# Patient Record
Sex: FEMALE | Race: OTHER | Employment: FULL TIME | URBAN - METROPOLITAN AREA
[De-identification: names, ages, dates, MRNs, and addresses within clinical notes are randomized per-mention and may not be internally consistent; named-entity substitution may affect disease eponyms.]

---

## 2023-03-16 ENCOUNTER — HOSPITAL ENCOUNTER (EMERGENCY)
Facility: HOSPITAL | Age: 38
Discharge: HOME | End: 2023-03-16
Attending: EMERGENCY MEDICINE

## 2023-03-16 ENCOUNTER — APPOINTMENT (OUTPATIENT)
Dept: ULTRASOUND IMAGING | Facility: HOSPITAL | Age: 38
End: 2023-03-16
Attending: EMERGENCY MEDICINE

## 2023-03-16 VITALS
WEIGHT: 110 LBS | RESPIRATION RATE: 19 BRPM | DIASTOLIC BLOOD PRESSURE: 74 MMHG | SYSTOLIC BLOOD PRESSURE: 135 MMHG | OXYGEN SATURATION: 97 % | TEMPERATURE: 98 F | HEIGHT: 64 IN | BODY MASS INDEX: 18.78 KG/M2 | HEART RATE: 86 BPM

## 2023-03-16 DIAGNOSIS — O20.0 THREATENED ABORTION: Primary | ICD-10-CM

## 2023-03-16 LAB
ALBUMIN SERPL-MCNC: 3.4 G/DL (ref 3.4–5)
ALP LIVER SERPL-CCNC: 82 U/L
ALT SERPL-CCNC: 14 U/L
ANION GAP SERPL CALC-SCNC: 7 MMOL/L (ref 0–18)
AST SERPL-CCNC: 7 U/L (ref 15–37)
B-HCG SERPL-ACNC: ABNORMAL MIU/ML
BASOPHILS # BLD AUTO: 0.03 X10(3) UL (ref 0–0.2)
BASOPHILS NFR BLD AUTO: 0.3 %
BILIRUB DIRECT SERPL-MCNC: <0.1 MG/DL (ref 0–0.2)
BILIRUB SERPL-MCNC: 0.5 MG/DL (ref 0.1–2)
BILIRUB UR QL: NEGATIVE
BUN BLD-MCNC: 11 MG/DL (ref 7–18)
BUN/CREAT SERPL: 16.2 (ref 10–20)
CALCIUM BLD-MCNC: 9.1 MG/DL (ref 8.5–10.1)
CHLORIDE SERPL-SCNC: 107 MMOL/L (ref 98–112)
CLARITY UR: CLEAR
CO2 SERPL-SCNC: 26 MMOL/L (ref 21–32)
COLOR UR: YELLOW
CREAT BLD-MCNC: 0.68 MG/DL
DEPRECATED RDW RBC AUTO: 38.9 FL (ref 35.1–46.3)
EOSINOPHIL # BLD AUTO: 0.13 X10(3) UL (ref 0–0.7)
EOSINOPHIL NFR BLD AUTO: 1.4 %
ERYTHROCYTE [DISTWIDTH] IN BLOOD BY AUTOMATED COUNT: 12.9 % (ref 11–15)
GFR SERPLBLD BASED ON 1.73 SQ M-ARVRAT: 114 ML/MIN/1.73M2 (ref 60–?)
GLUCOSE BLD-MCNC: 142 MG/DL (ref 70–99)
GLUCOSE UR-MCNC: NEGATIVE MG/DL
HCT VFR BLD AUTO: 32.6 %
HGB BLD-MCNC: 11.1 G/DL
IMM GRANULOCYTES # BLD AUTO: 0.02 X10(3) UL (ref 0–1)
IMM GRANULOCYTES NFR BLD: 0.2 %
KETONES UR-MCNC: NEGATIVE MG/DL
LYMPHOCYTES # BLD AUTO: 3.27 X10(3) UL (ref 1–4)
LYMPHOCYTES NFR BLD AUTO: 34 %
MCH RBC QN AUTO: 28.8 PG (ref 26–34)
MCHC RBC AUTO-ENTMCNC: 34 G/DL (ref 31–37)
MCV RBC AUTO: 84.5 FL
MONOCYTES # BLD AUTO: 0.53 X10(3) UL (ref 0.1–1)
MONOCYTES NFR BLD AUTO: 5.5 %
NEUTROPHILS # BLD AUTO: 5.63 X10 (3) UL (ref 1.5–7.7)
NEUTROPHILS # BLD AUTO: 5.63 X10(3) UL (ref 1.5–7.7)
NEUTROPHILS NFR BLD AUTO: 58.6 %
NITRITE UR QL STRIP.AUTO: NEGATIVE
OSMOLALITY SERPL CALC.SUM OF ELEC: 292 MOSM/KG (ref 275–295)
PH UR: 6 [PH] (ref 5–8)
PLATELET # BLD AUTO: 294 10(3)UL (ref 150–450)
POTASSIUM SERPL-SCNC: 3.8 MMOL/L (ref 3.5–5.1)
PROT SERPL-MCNC: 7.3 G/DL (ref 6.4–8.2)
PROT UR-MCNC: NEGATIVE MG/DL
RBC # BLD AUTO: 3.86 X10(6)UL
RH BLOOD TYPE: POSITIVE
SODIUM SERPL-SCNC: 140 MMOL/L (ref 136–145)
SP GR UR STRIP: 1.02 (ref 1–1.03)
UROBILINOGEN UR STRIP-ACNC: 2
VIT C UR-MCNC: NEGATIVE MG/DL
WBC # BLD AUTO: 9.6 X10(3) UL (ref 4–11)

## 2023-03-16 PROCEDURE — 76801 OB US < 14 WKS SINGLE FETUS: CPT | Performed by: EMERGENCY MEDICINE

## 2023-03-16 PROCEDURE — 99284 EMERGENCY DEPT VISIT MOD MDM: CPT

## 2023-03-16 PROCEDURE — 80048 BASIC METABOLIC PNL TOTAL CA: CPT | Performed by: EMERGENCY MEDICINE

## 2023-03-16 PROCEDURE — 76817 TRANSVAGINAL US OBSTETRIC: CPT | Performed by: EMERGENCY MEDICINE

## 2023-03-16 PROCEDURE — 85025 COMPLETE CBC W/AUTO DIFF WBC: CPT | Performed by: EMERGENCY MEDICINE

## 2023-03-16 PROCEDURE — 80076 HEPATIC FUNCTION PANEL: CPT | Performed by: EMERGENCY MEDICINE

## 2023-03-16 PROCEDURE — 86900 BLOOD TYPING SEROLOGIC ABO: CPT | Performed by: EMERGENCY MEDICINE

## 2023-03-16 PROCEDURE — 84702 CHORIONIC GONADOTROPIN TEST: CPT | Performed by: EMERGENCY MEDICINE

## 2023-03-16 PROCEDURE — 99285 EMERGENCY DEPT VISIT HI MDM: CPT

## 2023-03-16 PROCEDURE — 87086 URINE CULTURE/COLONY COUNT: CPT | Performed by: EMERGENCY MEDICINE

## 2023-03-16 PROCEDURE — 81001 URINALYSIS AUTO W/SCOPE: CPT | Performed by: EMERGENCY MEDICINE

## 2023-03-16 PROCEDURE — 96360 HYDRATION IV INFUSION INIT: CPT

## 2023-03-16 PROCEDURE — 86901 BLOOD TYPING SEROLOGIC RH(D): CPT | Performed by: EMERGENCY MEDICINE

## 2023-03-16 PROCEDURE — 96361 HYDRATE IV INFUSION ADD-ON: CPT

## 2023-03-16 RX ORDER — CHOLECALCIFEROL (VITAMIN D3) 25 MCG
1 TABLET,CHEWABLE ORAL DAILY
COMMUNITY

## 2023-03-16 NOTE — ED INITIAL ASSESSMENT (HPI)
Pt states  6 weeks gestation. States started vaginal bleeding approx 2pm. Every time goes to the bathroom has bleeding. Also having pain in lower back. Denies passing any clots.

## 2023-03-17 NOTE — DISCHARGE INSTRUCTIONS
Please return to the ER for any worsening symptoms including but not limited to: passing fetal tissue, worsening bleeding, lightheadedness, worsening abdominal pain, weakness, numbness, etc. Please follow with your Obstetrician in the next few days. If you had a beta hcg completed today, please have this lab value retested in the next 2 days. This value should double in the next 2 days. The Emergency Department is not intended to be a substitute for an effort to provide complete medical care. The imaging, if any, have often been interpreted on a preliminary basis pending final reading by the radiologist. If your blood pressure was greater than 140/90, please have this blood pressure rechecked by your primary care provider guera the next few days. You will benefit from a further discussion of lifestyle modifications that include Weight Reduction - Dietary Sodium Restriction - Increased Physical Activity and Moderation in alcohol (ETOH) Consumption. If possible check your pressure at home and keep a blood pressure log to bring to your physician.       Results for orders placed or performed during the hospital encounter of 63/43/52   Basic Metabolic Panel (8)    Collection Time: 03/16/23  8:45 PM   Result Value Ref Range    Glucose 142 (H) 70 - 99 mg/dL    Sodium 140 136 - 145 mmol/L    Potassium 3.8 3.5 - 5.1 mmol/L    Chloride 107 98 - 112 mmol/L    CO2 26.0 21.0 - 32.0 mmol/L    Anion Gap 7 0 - 18 mmol/L    BUN 11 7 - 18 mg/dL    Creatinine 0.68 0.55 - 1.02 mg/dL    BUN/CREA Ratio 16.2 10.0 - 20.0    Calcium, Total 9.1 8.5 - 10.1 mg/dL    Calculated Osmolality 292 275 - 295 mOsm/kg    eGFR-Cr 114 >=60 mL/min/1.73m2   HCG, Beta Subunit (Quant Pregnancy Test)    Collection Time: 03/16/23  8:45 PM   Result Value Ref Range    Hcg Quantitative 50,382.0 (H) <=3.0 mIU/mL   Hepatic Function Panel (7)    Collection Time: 03/16/23  8:45 PM   Result Value Ref Range    AST 7 (L) 15 - 37 U/L    ALT 14 13 - 56 U/L    Alkaline Phosphatase 82 37 - 98 U/L    Bilirubin, Total 0.5 0.1 - 2.0 mg/dL    Bilirubin, Direct <0.1 0.0 - 0.2 mg/dL    Total Protein 7.3 6.4 - 8.2 g/dL    Albumin 3.4 3.4 - 5.0 g/dL   ABORH (Blood Type)    Collection Time: 03/16/23  8:45 PM   Result Value Ref Range    ABO BLOOD TYPE O     RH BLOOD TYPE Positive    CBC W/ DIFFERENTIAL    Collection Time: 03/16/23  8:45 PM   Result Value Ref Range    WBC 9.6 4.0 - 11.0 x10(3) uL    RBC 3.86 3.80 - 5.30 x10(6)uL    HGB 11.1 (L) 12.0 - 16.0 g/dL    HCT 32.6 (L) 35.0 - 48.0 %    MCV 84.5 80.0 - 100.0 fL    MCH 28.8 26.0 - 34.0 pg    MCHC 34.0 31.0 - 37.0 g/dL    RDW-SD 38.9 35.1 - 46.3 fL    RDW 12.9 11.0 - 15.0 %    .0 150.0 - 450.0 10(3)uL    Neutrophil Absolute Prelim 5.63 1.50 - 7.70 x10 (3) uL    Neutrophil Absolute 5.63 1.50 - 7.70 x10(3) uL    Lymphocyte Absolute 3.27 1.00 - 4.00 x10(3) uL    Monocyte Absolute 0.53 0.10 - 1.00 x10(3) uL    Eosinophil Absolute 0.13 0.00 - 0.70 x10(3) uL    Basophil Absolute 0.03 0.00 - 0.20 x10(3) uL    Immature Granulocyte Absolute 0.02 0.00 - 1.00 x10(3) uL    Neutrophil % 58.6 %    Lymphocyte % 34.0 %    Monocyte % 5.5 %    Eosinophil % 1.4 %    Basophil % 0.3 %    Immature Granulocyte % 0.2 %   Urinalysis with Culture Reflex    Collection Time: 03/16/23  9:27 PM    Specimen: Urine   Result Value Ref Range    Urine Color Yellow Yellow    Clarity Urine Clear Clear    Spec Gravity 1.023 1.005 - 1.030    Glucose Urine Negative Negative mg/dL    Bilirubin Urine Negative Negative    Ketones Urine Negative Negative mg/dL    Blood Urine Moderate (A) Negative    pH Urine 6.0 5.0 - 8.0    Protein Urine Negative Negative mg/dL    Urobilinogen Urine 2.0 (A) <2.0    Nitrite Urine Negative Negative    Leukocyte Esterase Urine Trace (A) Negative    WBC Urine 6-10 (A) 0 - 5 /HPF    RBC Urine 0-2 0 - 2 /HPF    Bacteria Urine None Seen None Seen /HPF    Squamous Epi.  Cells Few (A) None Seen /HPF    Ca Oxalate Crystals Few (A) None Seen /HPF Ascorbic Acid Urine Negative Negative mg/dL     Pelvic  Us read by radiology:    IMPRESSION:  Single live intrauterine pregnancy with identified gestational sac, fetal pole, and yolk sac. Measured fetal heart rate of 126 beats/min. Estimated gestational age of 7 weeks, 4 days by mean crown-rump length. Incidental small cervical nabothian cysts. Normal bilateral ovaries. Incidentally noted small 3.4 cm physiologic corpus luteal cyst in the left ovary. No appreciable free fluid or adnexal masses. Report faxed to the ER and radiology departments and/or is made available via EMR at 11:35 PM ET. Please do not hesitate to contact [facility] at the above number if there are specific questions regarding this study.     Janice Solomon M.D.

## 2023-03-17 NOTE — ED QUICK NOTES
Pt. Hannah Dam in for bleeding that started around 2 pm today. She states that she had a large amount of vaginal bleeding and the second time was lighter. But before she got here she said she had large amount of bleeding again. She also has back pain and lower abdomen cramping. She states she is 6 weeks pregnant and she has never had symptoms like this before.